# Patient Record
Sex: MALE | Race: WHITE | URBAN - METROPOLITAN AREA
[De-identification: names, ages, dates, MRNs, and addresses within clinical notes are randomized per-mention and may not be internally consistent; named-entity substitution may affect disease eponyms.]

---

## 2017-06-07 ENCOUNTER — OUTPATIENT (OUTPATIENT)
Dept: OUTPATIENT SERVICES | Facility: HOSPITAL | Age: 37
LOS: 1 days | Discharge: HOME | End: 2017-06-07

## 2017-06-28 DIAGNOSIS — K01.1 IMPACTED TEETH: ICD-10-CM

## 2017-08-06 ENCOUNTER — EMERGENCY (EMERGENCY)
Facility: HOSPITAL | Age: 37
LOS: 0 days | Discharge: HOME | End: 2017-08-07

## 2017-08-06 DIAGNOSIS — N48.89 OTHER SPECIFIED DISORDERS OF PENIS: ICD-10-CM

## 2017-08-06 DIAGNOSIS — R30.0 DYSURIA: ICD-10-CM

## 2017-08-06 DIAGNOSIS — Z87.891 PERSONAL HISTORY OF NICOTINE DEPENDENCE: ICD-10-CM

## 2017-10-01 ENCOUNTER — EMERGENCY (EMERGENCY)
Facility: HOSPITAL | Age: 37
LOS: 0 days | Discharge: HOME | End: 2017-10-01

## 2017-10-01 DIAGNOSIS — Z87.891 PERSONAL HISTORY OF NICOTINE DEPENDENCE: ICD-10-CM

## 2017-10-01 DIAGNOSIS — A60.02 HERPESVIRAL INFECTION OF OTHER MALE GENITAL ORGANS: ICD-10-CM

## 2017-10-01 DIAGNOSIS — R30.0 DYSURIA: ICD-10-CM

## 2017-11-02 ENCOUNTER — EMERGENCY (EMERGENCY)
Facility: HOSPITAL | Age: 37
LOS: 0 days | Discharge: HOME | End: 2017-11-03

## 2017-11-08 DIAGNOSIS — N50.89 OTHER SPECIFIED DISORDERS OF THE MALE GENITAL ORGANS: ICD-10-CM

## 2017-11-08 DIAGNOSIS — A60.02 HERPESVIRAL INFECTION OF OTHER MALE GENITAL ORGANS: ICD-10-CM

## 2017-11-28 ENCOUNTER — APPOINTMENT (OUTPATIENT)
Dept: UROLOGY | Facility: CLINIC | Age: 37
End: 2017-11-28

## 2017-12-06 ENCOUNTER — EMERGENCY (EMERGENCY)
Facility: HOSPITAL | Age: 37
LOS: 0 days | Discharge: HOME | End: 2017-12-06

## 2017-12-06 DIAGNOSIS — R07.9 CHEST PAIN, UNSPECIFIED: ICD-10-CM

## 2017-12-06 DIAGNOSIS — B34.9 VIRAL INFECTION, UNSPECIFIED: ICD-10-CM

## 2017-12-06 DIAGNOSIS — R05 COUGH: ICD-10-CM

## 2018-03-17 ENCOUNTER — OUTPATIENT (OUTPATIENT)
Dept: OUTPATIENT SERVICES | Facility: HOSPITAL | Age: 38
LOS: 1 days | Discharge: HOME | End: 2018-03-17

## 2018-03-17 DIAGNOSIS — R79.89 OTHER SPECIFIED ABNORMAL FINDINGS OF BLOOD CHEMISTRY: ICD-10-CM

## 2019-05-19 ENCOUNTER — EMERGENCY (EMERGENCY)
Facility: HOSPITAL | Age: 39
LOS: 0 days | Discharge: HOME | End: 2019-05-19
Admitting: EMERGENCY MEDICINE
Payer: MEDICAID

## 2019-05-19 VITALS
HEART RATE: 66 BPM | TEMPERATURE: 98 F | WEIGHT: 179.9 LBS | DIASTOLIC BLOOD PRESSURE: 89 MMHG | OXYGEN SATURATION: 98 % | RESPIRATION RATE: 18 BRPM | SYSTOLIC BLOOD PRESSURE: 137 MMHG

## 2019-05-19 DIAGNOSIS — K64.9 UNSPECIFIED HEMORRHOIDS: ICD-10-CM

## 2019-05-19 DIAGNOSIS — Z79.899 OTHER LONG TERM (CURRENT) DRUG THERAPY: ICD-10-CM

## 2019-05-19 DIAGNOSIS — K59.00 CONSTIPATION, UNSPECIFIED: ICD-10-CM

## 2019-05-19 PROCEDURE — 99283 EMERGENCY DEPT VISIT LOW MDM: CPT | Mod: 25

## 2019-05-19 RX ORDER — HYDROCORTISONE 1 %
1 OINTMENT (GRAM) TOPICAL
Qty: 1 | Refills: 0
Start: 2019-05-19 | End: 2019-05-25

## 2019-05-19 RX ORDER — DOCUSATE SODIUM 100 MG
1 CAPSULE ORAL
Qty: 14 | Refills: 0
Start: 2019-05-19 | End: 2019-05-25

## 2019-05-19 NOTE — ED PROVIDER NOTE - CARE PROVIDER_API CALL
Celso Alvarez)  ColonRectal Surgery  [address] [place] [address], [address] [place] [address]  Phone: [phone]  Fax: [phone]  Follow Up Time:

## 2019-05-19 NOTE — ED PROVIDER NOTE - NSFOLLOWUPINSTRUCTIONS_ED_ALL_ED_FT
Hemorrhoids    Hemorrhoids are swollen veins in and around the rectum or anus. There are two types of hemorrhoids:     Internal hemorrhoids. These occur in the veins that are just inside the rectum. They may poke through to the outside and become irritated and painful.  External hemorrhoids. These occur in the veins that are outside of the anus and can be felt as a painful swelling or hard lump near the anus.    Most hemorrhoids do not cause serious problems, and they can be managed with home treatments such as diet and lifestyle changes. If home treatments do not help your symptoms, procedures can be done to shrink or remove the hemorrhoids.    CAUSES  This condition is caused by increased pressure in the anal area. This pressure may result from various things, including:    Constipation.  Straining to have a bowel movement.  Diarrhea.  Pregnancy.  Obesity.  Sitting for long periods of time.  Heavy lifting or other activity that causes you to strain.  Anal sex.    SYMPTOMS  Symptoms of this condition include:    Pain.  Anal itching or irritation.  Rectal bleeding.  Leakage of stool (feces).  Anal swelling.  One or more lumps around the anus.    DIAGNOSIS  This condition can often be diagnosed through a visual exam. Other exams or tests may also be done, such as:    Examination of the rectal area with a gloved hand (digital rectal exam).  Examination of the anal canal using a small tube (anoscope).  A blood test, if you have lost a significant amount of blood.  A test to look inside the colon (sigmoidoscopy or colonoscopy).    TREATMENT  This condition can usually be treated at home. However, various procedures may be done if dietary changes, lifestyle changes, and other home treatments do not help your symptoms. These procedures can help make the hemorrhoids smaller or remove them completely. Some of these procedures involve surgery, and others do not. Common procedures include:    Rubber band ligation. Rubber bands are placed at the base of the hemorrhoids to cut off the blood supply to them.  Sclerotherapy. Medicine is injected into the hemorrhoids to shrink them.  Infrared coagulation. A type of light energy is used to get rid of the hemorrhoids.  Hemorrhoidectomy surgery. The hemorrhoids are surgically removed, and the veins that supply them are tied off.  Stapled hemorrhoidopexy surgery. A circular stapling device is used to remove the hemorrhoids and use staples to cut off the blood supply to them.    HOME CARE INSTRUCTIONS  Eating and Drinking    Eat foods that have a lot of fiber in them, such as whole grains, beans, nuts, fruits, and vegetables. Ask your health care provider about taking products that have added fiber (fiber supplements).  Drink enough fluid to keep your urine clear or pale yellow.    Managing Pain and Swelling    Take warm sitz baths for 20 minutes, 3–4 times a day to ease pain and discomfort.  If directed, apply ice to the affected area. Using ice packs between sitz baths may be helpful.  Put ice in a plastic bag.  Place a towel between your skin and the bag.  Leave the ice on for 20 minutes, 2–3 times a day.    General Instructions    Take over-the-counter and prescription medicines only as told by your health care provider.  Use medicated creams or suppositories as told.  Exercise regularly.  Go to the bathroom when you have the urge to have a bowel movement. Do not wait.  Avoid straining to have bowel movements.  Keep the anal area dry and clean. Use wet toilet paper or moist towelettes after a bowel movement.  Do not sit on the toilet for long periods of time. This increases blood pooling and pain.    SEEK MEDICAL CARE IF:  You have increasing pain and swelling that are not controlled by treatment or medicine.  You have uncontrolled bleeding.  You have difficulty having a bowel movement, or you are unable to have a bowel movement.  You have pain or inflammation outside the area of the hemorrhoids.    ADDITIONAL NOTES AND INSTRUCTIONS    Please follow up with your Primary MD in 24-48 hr.  Seek immediate medical care for any new/worsening signs or symptoms.

## 2019-05-19 NOTE — ED PROVIDER NOTE - PHYSICAL EXAMINATION
CONSTITUTIONAL: Well-appearing; well-nourished; in no apparent distress.   GI/: +hemorrhoid, 9oclock, nonthrombosed; Normal bowel sounds; non-distended; non-tender; no palpable organomegaly.   SKIN: see above; Normal for age and race; warm; dry; good turgor; no apparent lesions or exudate.   NEURO/PSYCH: A & O x 4; grossly unremarkable. mood and manner are appropriate. Grooming and personal hygiene are appropriate. No apparent thoughts of harm to self or others.

## 2019-05-19 NOTE — ED PROVIDER NOTE - OBJECTIVE STATEMENT
h/o hemorrhoids presents for hemorrhoid noted for the last week  pain with defacation, +constipation, denies bleeding  pain is sharp, nonradiating, moderate  denies exacerbating or relieving factors  Denies fever/chill/HA/dizziness/chest pain/palpitation/sob/abd pain/n/v/d/ black stool/bloody stool/urinary sxs

## 2019-05-19 NOTE — ED PROVIDER NOTE - PROGRESS NOTE DETAILS
+constipation, +hemorrhoid- plan for colace, increased po fluid intake, anusol and f/u with colorectal  Pt counseled - warning si/sxs d/w pt. Pt instructed to return to ed or f/u with PCP should sxs persist/worsen. Pt verbalizes an understanding & agreement with the plan as discussed

## 2019-05-20 ENCOUNTER — CHART COPY (OUTPATIENT)
Age: 39
End: 2019-05-20

## 2020-02-19 PROBLEM — K64.9 UNSPECIFIED HEMORRHOIDS: Chronic | Status: ACTIVE | Noted: 2019-05-19

## 2020-04-02 ENCOUNTER — APPOINTMENT (OUTPATIENT)
Dept: UROLOGY | Facility: CLINIC | Age: 40
End: 2020-04-02

## 2020-07-31 ENCOUNTER — APPOINTMENT (OUTPATIENT)
Dept: UROLOGY | Facility: CLINIC | Age: 40
End: 2020-07-31

## 2022-08-16 ENCOUNTER — APPOINTMENT (OUTPATIENT)
Dept: INTERNAL MEDICINE | Facility: CLINIC | Age: 42
End: 2022-08-16

## 2022-10-30 ENCOUNTER — EMERGENCY (EMERGENCY)
Facility: HOSPITAL | Age: 42
LOS: 0 days | Discharge: HOME | End: 2022-10-30
Attending: EMERGENCY MEDICINE | Admitting: EMERGENCY MEDICINE

## 2022-10-30 VITALS
DIASTOLIC BLOOD PRESSURE: 90 MMHG | WEIGHT: 169.98 LBS | OXYGEN SATURATION: 100 % | SYSTOLIC BLOOD PRESSURE: 150 MMHG | RESPIRATION RATE: 18 BRPM | HEART RATE: 88 BPM | TEMPERATURE: 98 F

## 2022-10-30 DIAGNOSIS — R35.0 FREQUENCY OF MICTURITION: ICD-10-CM

## 2022-10-30 DIAGNOSIS — R36.9 URETHRAL DISCHARGE, UNSPECIFIED: ICD-10-CM

## 2022-10-30 DIAGNOSIS — R30.0 DYSURIA: ICD-10-CM

## 2022-10-30 LAB
APPEARANCE UR: CLEAR — SIGNIFICANT CHANGE UP
BACTERIA # UR AUTO: NEGATIVE — SIGNIFICANT CHANGE UP
BILIRUB UR-MCNC: NEGATIVE — SIGNIFICANT CHANGE UP
COLOR SPEC: SIGNIFICANT CHANGE UP
DIFF PNL FLD: NEGATIVE — SIGNIFICANT CHANGE UP
EPI CELLS # UR: 1 /HPF — SIGNIFICANT CHANGE UP (ref 0–5)
GLUCOSE UR QL: NEGATIVE — SIGNIFICANT CHANGE UP
HYALINE CASTS # UR AUTO: 3 /LPF — SIGNIFICANT CHANGE UP (ref 0–7)
KETONES UR-MCNC: NEGATIVE — SIGNIFICANT CHANGE UP
LEUKOCYTE ESTERASE UR-ACNC: ABNORMAL
NITRITE UR-MCNC: NEGATIVE — SIGNIFICANT CHANGE UP
PH UR: 6 — SIGNIFICANT CHANGE UP (ref 5–8)
PROT UR-MCNC: SIGNIFICANT CHANGE UP
RBC CASTS # UR COMP ASSIST: 4 /HPF — SIGNIFICANT CHANGE UP (ref 0–4)
SP GR SPEC: 1.02 — SIGNIFICANT CHANGE UP (ref 1.01–1.03)
UROBILINOGEN FLD QL: SIGNIFICANT CHANGE UP
WBC UR QL: 32 /HPF — HIGH (ref 0–5)

## 2022-10-30 PROCEDURE — 99284 EMERGENCY DEPT VISIT MOD MDM: CPT

## 2022-10-30 RX ORDER — CEFTRIAXONE 500 MG/1
500 INJECTION, POWDER, FOR SOLUTION INTRAMUSCULAR; INTRAVENOUS ONCE
Refills: 0 | Status: COMPLETED | OUTPATIENT
Start: 2022-10-30 | End: 2022-10-30

## 2022-10-30 RX ORDER — CEFUROXIME AXETIL 250 MG
1 TABLET ORAL
Qty: 14 | Refills: 0
Start: 2022-10-30 | End: 2022-11-05

## 2022-10-30 RX ORDER — CEFUROXIME AXETIL 250 MG
1 TABLET ORAL
Qty: 14 | Refills: 0
Start: 2022-10-30 | End: 2022-11-19

## 2022-10-30 RX ADMIN — CEFTRIAXONE 500 MILLIGRAM(S): 500 INJECTION, POWDER, FOR SOLUTION INTRAMUSCULAR; INTRAVENOUS at 01:52

## 2022-10-30 RX ADMIN — Medication 100 MILLIGRAM(S): at 01:51

## 2022-10-30 NOTE — ED PROVIDER NOTE - PHYSICAL EXAMINATION
Const: Well nourished, well developed, appears stated age  Eyes: PERRL, no conjunctival injection  HENT:  Neck supple without meningismus   CV: RRR, Warm, well-perfused extremities  RESP: CTA B/L, no tachypnea   GI: soft, non-tender, non-distended  : uncircumcised. no penile discharge from meatus. no testicular pain or swelling (exam done with DARYL Mccartney)  MSK: No gross deformities appreciated  Skin: Warm, dry. No rashes  Neuro: Alert, CNs II-XII grossly intact. Sensation and motor function of extremities grossly intact.  Psych: Appropriate mood and affect.

## 2022-10-30 NOTE — ED PROVIDER NOTE - NS ED ROS FT
Review of Systems   Constitutional:  No Weight Change, No Fever, No Chills, No weakness    Cardiovascular:  No Chest Pain,   Respiratory:  No SOB, No Cough,   Gastrointestinal:  No Nausea, No Vomiting, No abdominal pain,   Genitourinary:  + Dysuria, + Urinary Frequency, No Hematuria, No Urinary Incontinence, penile discharge, no testicular pain, no penile pain or swelling   Musculoskeletal:  No Arthralgias, No Myalgias, No Joint Swelling, No Joint Stiffness,  Skin:  No rashes

## 2022-10-30 NOTE — ED ADULT TRIAGE NOTE - CHIEF COMPLAINT QUOTE
Pt presents to the ED c/o of having problems fully urinating as well as a burning sensation when he pees.

## 2022-10-30 NOTE — ED ADULT NURSE NOTE - NS ED NURSE LEVEL OF CONSCIOUSNESS SPEECH
Addended by: MARITO LUNA on: 9/27/2018 12:54 PM     Modules accepted: Orders    
Addended by: SAVANNA CLEMENS on: 9/27/2018 11:49 AM     Modules accepted: Orders    
Speaking Coherently

## 2022-10-30 NOTE — ED PROVIDER NOTE - OBJECTIVE STATEMENT
42-year-old male with no past medical history presents to ED for dysuria as well as light yellow discharge from his penis that started at 6 PM today.  Patient's never had this in the past.  No testicular pain no penile pain.  No abdominal pain no flank pain no fever no chills.  Patient states he sexually active with his wife no other sexual partners.  No history of STDs. Yes

## 2022-10-30 NOTE — ED PROVIDER NOTE - CLINICAL SUMMARY MEDICAL DECISION MAKING FREE TEXT BOX
42-year-old male presenting to ED for penile discharge concerning for STI.  Patient was treated in the emergency department.  Culture pending DC home with strict return precautions.

## 2022-10-30 NOTE — ED PROVIDER NOTE - PATIENT PORTAL LINK FT
You can access the FollowMyHealth Patient Portal offered by [facility] by registering at the following website: [email or web address]. By joining [employer]’s FollowMyHealth portal, you will also be able to view your health information using other applications (apps) compatible with our system.

## 2022-10-31 LAB
C TRACH RRNA SPEC QL NAA+PROBE: SIGNIFICANT CHANGE UP
CULTURE RESULTS: SIGNIFICANT CHANGE UP
N GONORRHOEA RRNA SPEC QL NAA+PROBE: DETECTED
SPECIMEN SOURCE: SIGNIFICANT CHANGE UP
SPECIMEN SOURCE: SIGNIFICANT CHANGE UP

## 2022-11-01 NOTE — ED POST DISCHARGE NOTE - DETAILS
I AM A CERTIFIED [place] SPEAKER. PRECAUTIONS ADVISED. ADVISED F/U WITH PMD THIS WEEK. ADVISED REPEAT TESTING WITHIN 4 WEEKS.

## 2022-11-13 ENCOUNTER — EMERGENCY (EMERGENCY)
Facility: HOSPITAL | Age: 42
LOS: 0 days | Discharge: HOME | End: 2022-11-13
Attending: EMERGENCY MEDICINE | Admitting: EMERGENCY MEDICINE

## 2022-11-13 VITALS
TEMPERATURE: 97 F | WEIGHT: 169.76 LBS | OXYGEN SATURATION: 99 % | HEART RATE: 83 BPM | DIASTOLIC BLOOD PRESSURE: 94 MMHG | RESPIRATION RATE: 18 BRPM | SYSTOLIC BLOOD PRESSURE: 149 MMHG

## 2022-11-13 DIAGNOSIS — R21 RASH AND OTHER NONSPECIFIC SKIN ERUPTION: ICD-10-CM

## 2022-11-13 DIAGNOSIS — R30.0 DYSURIA: ICD-10-CM

## 2022-11-13 DIAGNOSIS — R36.9 URETHRAL DISCHARGE, UNSPECIFIED: ICD-10-CM

## 2022-11-13 LAB
APPEARANCE UR: CLEAR — SIGNIFICANT CHANGE UP
BILIRUB UR-MCNC: NEGATIVE — SIGNIFICANT CHANGE UP
COLOR SPEC: SIGNIFICANT CHANGE UP
DIFF PNL FLD: NEGATIVE — SIGNIFICANT CHANGE UP
GLUCOSE UR QL: NEGATIVE — SIGNIFICANT CHANGE UP
HIV 1 & 2 AB SERPL IA.RAPID: SIGNIFICANT CHANGE UP
KETONES UR-MCNC: NEGATIVE — SIGNIFICANT CHANGE UP
LEUKOCYTE ESTERASE UR-ACNC: NEGATIVE — SIGNIFICANT CHANGE UP
NITRITE UR-MCNC: NEGATIVE — SIGNIFICANT CHANGE UP
PH UR: 6 — SIGNIFICANT CHANGE UP (ref 5–8)
PROT UR-MCNC: NEGATIVE — SIGNIFICANT CHANGE UP
SP GR SPEC: 1.01 — SIGNIFICANT CHANGE UP (ref 1.01–1.03)
UROBILINOGEN FLD QL: SIGNIFICANT CHANGE UP

## 2022-11-13 PROCEDURE — 99284 EMERGENCY DEPT VISIT MOD MDM: CPT

## 2022-11-13 RX ORDER — VALACYCLOVIR 500 MG/1
1 TABLET, FILM COATED ORAL
Qty: 10 | Refills: 0
Start: 2022-11-13 | End: 2022-11-22

## 2022-11-13 RX ORDER — CEFTRIAXONE 500 MG/1
500 INJECTION, POWDER, FOR SOLUTION INTRAMUSCULAR; INTRAVENOUS ONCE
Refills: 0 | Status: COMPLETED | OUTPATIENT
Start: 2022-11-13 | End: 2022-11-13

## 2022-11-13 RX ORDER — METRONIDAZOLE 500 MG
1 TABLET ORAL
Qty: 14 | Refills: 0
Start: 2022-11-13 | End: 2022-11-19

## 2022-11-13 RX ADMIN — Medication 100 MILLIGRAM(S): at 01:47

## 2022-11-13 RX ADMIN — CEFTRIAXONE 500 MILLIGRAM(S): 500 INJECTION, POWDER, FOR SOLUTION INTRAMUSCULAR; INTRAVENOUS at 01:50

## 2022-11-13 NOTE — ED PROVIDER NOTE - CARE PLAN
Assessment and plan of treatment:	Plan: ua + ucx, pt would like to re tested, and treated, will also add anit virals, safe sex practices also discussed.   Principal Discharge DX:	Dysuria  Assessment and plan of treatment:	Plan: ua + ucx, pt would like to re tested, and treated, will also add anit virals, safe sex practices also discussed.   1

## 2022-11-13 NOTE — ED PROVIDER NOTE - PLAN OF CARE
Plan: ua + ucx, pt would like to re tested, and treated, will also add anit virals, safe sex practices also discussed.

## 2022-11-13 NOTE — ED PROVIDER NOTE - PHYSICAL EXAMINATION
Vital Signs: I have reviewed the initial vital signs.  Constitutional: well-nourished, appears stated age, no acute distress.  HEENT: Airway patent, moist MM, no erythema/swelling/deformity of oral structures. EOMI, PERRLA.  CV: regular rate, regular rhythm, well-perfused extremities, 2+ bilateral DP and radial pulses equal.  Lungs: Clear to ascultation bilaterally, no crackles, no wheezes, no increased work of breathing.  ABD: Non-tender, Non-distended, no guarding or rebound, no pulsatile mass, no hernias, no flank pain.   : uncircumcised penis, no penile discharge, vesicles to R side of penile head, no inguinal lymphadenopathy   MSK: Neck supple, nontender, normal range of motion, no stepoff. Chest nontender. Back nontender   INTEG: Skin warm, dry, no rash.  NEURO: A&Ox3, moving all extremities, normal speech  PSYCH: Calm, cooperative, normal affect and interaction.

## 2022-11-13 NOTE — ED PROVIDER NOTE - PROGRESS NOTE DETAILS
ED Attending SAMI Rojas  Extensive conversation had with patient using .  Patient will receive sex practices.  Medications sent to pharmacy including antiviral.  Patient aware of importance of obtaining a PMD.  Follow-up with neurology and infectious disease as well.  Patient comfortable with plan.  States will follow up.

## 2022-11-13 NOTE — ED PROVIDER NOTE - ATTENDING CONTRIBUTION TO CARE
: [id number].  42-year-old male with no significant past medical history seen in the emergency department on October 30 for dysuria associated with penile discharge tested positive for gonorrhea (was treated with ceftriaxone and doxycycline, prescription was sent for doxycycline and cefuroxime which patient reports he took) presents for persistent symptoms of dysuria,  penile discharge associated with penile lesions that are painful as well as pruritus around the penis.  Patient reports he has not been sexually active for the past 3 weeks and the last time he was sexually active was with the same partner only female.  No previous history of STI.  denies fever, chills, n/v, cp, sob, pleuritic chest pain, palpitations, diaphoresis, cough, abd pain , diarrhea, constipation, melena/brbpr, uback/ flank pain, testicular pain/swelling/erythema, history of sti's, sick contacts, recent travel or rash.      on exam: non toxic appearing pt sitting on stretcher in nad, no rash, mmm, regular rate, radial pulses 2/4 b/l, no jvd, ctabl w/ breath sounds present b/l, no wheezing or crackles,  no accessory muscle use, no tachypnea, no stridor, bs present throughout all 4 quadrants, abd soft, nd, nt , no rebound tenderness or guarding, no cvat, (-) Rovsing (-) Obturator (-) Psaos. (-) Smiley's,  exam done with Dr. Solomon with supervision: non circumcised male, no paraphimosis or phimosis, no penile discharge, no testicular pain/swelling/erythema, good cremasteric reflex, no inguinal palpable masses. no presence of hernia. small papules on penis as well as small vesicular lesions. FROM of ext, no edema, no calf pain/swelling/erythema, AAOx3. No focal deficits.

## 2022-11-13 NOTE — ED PROVIDER NOTE - NSFOLLOWUPINSTRUCTIONS_ED_ALL_ED_FT
Las infecciones de transmisión sexual (ITS) son enfermedades que se transmiten de damon persona a otra (se contagian). Se propagan o se transmitena través de los líquidos corporales que se intercambian alysha las relaciones sexuales o el contacto sexual. Estos líquidos corporales pueden ser saliva, semen, jian, mucosidad vaginal y orina. Las ITS son muy frecuentes entre personas de todas las edades.    Algunas de las ITS más frecuentes son las siguientes:  •Herpes.      •Hepatitis B.      •Clamidia.      •Gonorrea.      •Sífilis.      •VPH (virus del papiloma humano).      •VIH, también denominado virus de inmunodeficiencia humana. Hanh es el virus que puede causar SIDA (síndrome de inmunodeficiencia adquirida).      A menudo, las personas que tienen estas ITS no tienen síntomas. Incluso sin síntomas, estas infecciones pueden transmitirse de damon persona a otra y requieren tratamiento.      ¿Cómo pueden afectarme estas enfermedades?    Las ITS pueden tratarse, y muchas se curan. Sin embargo, algunas ITS no tienen sherman y lo afectarán por el zay de winn talya.    Ciertas ITS pueden tener las siguientes consecuencias:  •Obligarlo a seamus medicamentos por el zay de winn talya.      •Afectar winn capacidad para tener hijos (winn fertilidad).    •Aumentar el riesgo de desarrollar otra ITS o ciertas enfermedades graves. Estas pueden incluir las siguientes:  •Cáncer de joseph uterino.      •Cáncer de chaya y joseph.      •Enfermedad pélvica inflamatoria (EPI) en las mujeres.      •Daño en los órganos u otras partes del cuerpo, si la infección se propaga.        •Causar problemas alysha el embarazo; además, pueden transmitirse al bebé alysha el embarazo o el parto.        ¿Qué puede aumentar el riesgo?    Puede tener un riesgo mayor de contraer damon ITS si:   •Tiene relaciones sexuales sin protección. Princess Anne chan las relaciones sexuales orales, vaginales y anales.      •Tiene más de damon qing sexual.      •Tiene damon qing sexual que tiene múltiples parejas sexuales.      •Tiene relaciones sexuales con alguien que tiene damon ITS.      •Tiene damon ITS o tuvo damon ITS antes.      •Se inyecta drogas o tiene damon o más parejas sexuales que se inyectan drogas.        ¿Qué medidas puedo seamus para evitar las ITS?    La única manera de evitar totalmente las ITS es no tener relaciones sexuales de ningún tipo. Princess Anne se denomina practicar la abstinencia. Si usted es sexualmente activo, puede protegerse y proteger a los demás tomando estas medidas para disminuir el riesgo de contraer damon ITS:    Estilo de talya     Evite mezclar alcohol, drogas y sexo. El consumo de alcohol y drogas puede afectar winn capacidad de seamus buenas decisiones y puede conducirlo a comportamientos sexuales riesgosos.    Medicamentos     Pregunte al médico sobre la profilaxis preexposición (PrEP) para evitar la infección por VIH.      Información general    •Mantenga winn esquema de vacunación al día. Algunas vacunas pueden disminuir el riesgo de contraer algunas ITS, tales sam:  •Vacunas contra la hepatitis A y B. Es posible que haya recibido vacunas cuando era kennedy, geoffrey probablemente necesite damon vacuna/inyección de refuerzo si es un adolescente o un adulto joven.      •Vacuna contra el VPH (virus del papiloma humano).        •Tenga damon susannah qing sexual (sea monógamo) o limite la cantidad de parejas sexuales que tiene.    •Use métodos que impidan el intercambio de líquidos corporales entre la qing (anticonceptivos de foote) de manera correcta cada vez que tenga relaciones sexuales. Los anticonceptivos de foote se pueden usar alysha las relaciones sexuales orales, vaginales o anales. Algunos de los métodos de foote más usados son los siguientes:  •Condón masculino.       •Condón femenino.       •Foote bucal.        •Use un condón nuevo para cada acto sexual, desde el comienzo hasta el final.      •Hágase exámenes de ITS. Riri que ronit parejas también se realicen los exámenes.      •Si el resultado del examen de damon ITS es positivo, siga las recomendaciones del médico sobre el tratamiento y asegúrese de que ronit parejas sexuales se realicen exámenes y reciban tratamiento.      •Las píldoras, inyecciones e implantes anticonceptivos y los dispositivos intrauterinos (DIU) no protegen contra las ITS. Para evitar tanto las ITS sam un embarazo, use siempre un condón combinado con otro método anticonceptivo.      •Algunas ITS, sam el herpes, se contagian por el contacto piel con piel. Es posible que un condón no proporcione protección contra esas ITS. Evite todo contacto sexual si usted o ronit parejas tienen herpes y hay damon erupción activa con heridas abiertas.        Dónde buscar más información    Obtenga más información sobre las ITS en:•Centers for Disease Control and Prevention (Centros para el Control y la Prevención de Enfermedades):  •Más información sobre determinadas ITS: cdc.gov/std      •Lugares donde obtener asesoramiento y tratamiento respecto de la yamilex sexual, gratuitos o de bajo costo: gettested.cdc.gov        •U.S. Department of Health and Human Services (Departamento de Yamilex y Servicios Humanos de los Estados Unidos): www.womenshealth.gov        Resumen    •Las infecciones de transmisión sexual (ITS) pueden transmitirse a través del intercambio de líquidos corporales alysha el contacto sexual. Los líquidos corporales pueden ser saliva, semen, jian, mucosidad vaginal y orina.      •Usted puede tener un mayor riesgo de contraer damon ITS si tiene relaciones sexuales sin protección. Princess Anne chan las relaciones sexuales orales, vaginales y anales.      •Si tiene relaciones sexuales, limite la cantidad de parejas sexuales y use un método de protección de foote cada vez que tenga relaciones sexuales.      Esta información no tiene sam fin reemplazar el consejo del médico. Asegúrese de hacerle al médico cualquier pregunta que tenga.

## 2022-11-13 NOTE — ED PROVIDER NOTE - OBJECTIVE STATEMENT
Patient is a 42y M recently evaluated for dysuria testing (+) for gonorrhea presents for evaluation of dysuria and "pimples" on his penis. Patient completed his course of antibiotics but his symptoms did not improve so presents for reevaluation. Patient is sexually active with 1 female partner last intercourse 3 weeks ago using protection. No previous hx of STI.

## 2022-11-13 NOTE — ED PROVIDER NOTE - PATIENT PORTAL LINK FT
You can access the FollowMyHealth Patient Portal offered by [facility] by registering at the following website: [email or web address]. By joining [employer]’s FollowMyHealth portal, you will also be able to view your health information using other applications (apps) compatible with our system.

## 2022-11-13 NOTE — ED PROVIDER NOTE - CLINICAL SUMMARY MEDICAL DECISION MAKING FREE TEXT BOX
Pt presented for persistent symptoms of dysuria with penile discharge and lesions. STI testing re sent and meds given, pt vesicular lesions on exam, concern for herpes, anti viral added. Patient is a good candidate to attempt outpatient management. Supportive care and home care discussed in detail. Patient aware they may have to return for re-evaluation if outpatient treatment fails. Strict return precautions discussed.  Will follow up as discussed.

## 2022-11-13 NOTE — ED PROVIDER NOTE - NS ED ROS FT
Review of Systems    Constitutional: (-) fever  Cardiovascular: (-) chest pain, (-) syncope  Respiratory: (-) cough, (-) shortness of breath  Gastrointestinal: (-) vomiting, (-) diarrhea, (-) abdominal pain  Musculoskeletal: (-) neck pain, (-) back pain, (-) joint pain  Integumentary: (+) rash on penis, (-) edema  Neurological: (-) headache, (-) altered mental status    Except as documented in the HPI, all other systems are negative.

## 2022-11-14 LAB
CULTURE RESULTS: NO GROWTH — SIGNIFICANT CHANGE UP
SPECIMEN SOURCE: SIGNIFICANT CHANGE UP
T PALLIDUM AB TITR SER: NEGATIVE — SIGNIFICANT CHANGE UP

## 2022-12-01 ENCOUNTER — APPOINTMENT (OUTPATIENT)
Dept: UROLOGY | Facility: CLINIC | Age: 42
End: 2022-12-01

## 2023-03-08 ENCOUNTER — EMERGENCY (EMERGENCY)
Facility: HOSPITAL | Age: 43
LOS: 0 days | Discharge: HOME | End: 2023-03-09
Attending: STUDENT IN AN ORGANIZED HEALTH CARE EDUCATION/TRAINING PROGRAM
Payer: MEDICAID

## 2023-03-08 VITALS
HEIGHT: 66 IN | DIASTOLIC BLOOD PRESSURE: 80 MMHG | HEART RATE: 78 BPM | OXYGEN SATURATION: 99 % | SYSTOLIC BLOOD PRESSURE: 130 MMHG | WEIGHT: 160.06 LBS | RESPIRATION RATE: 18 BRPM | TEMPERATURE: 98 F

## 2023-03-08 DIAGNOSIS — Z87.19 PERSONAL HISTORY OF OTHER DISEASES OF THE DIGESTIVE SYSTEM: ICD-10-CM

## 2023-03-08 DIAGNOSIS — Z20.2 CONTACT WITH AND (SUSPECTED) EXPOSURE TO INFECTIONS WITH A PREDOMINANTLY SEXUAL MODE OF TRANSMISSION: ICD-10-CM

## 2023-03-08 DIAGNOSIS — A54.9 GONOCOCCAL INFECTION, UNSPECIFIED: ICD-10-CM

## 2023-03-08 PROCEDURE — 86703 HIV-1/HIV-2 1 RESULT ANTBDY: CPT

## 2023-03-08 PROCEDURE — 99283 EMERGENCY DEPT VISIT LOW MDM: CPT | Mod: 25

## 2023-03-08 PROCEDURE — 86706 HEP B SURFACE ANTIBODY: CPT

## 2023-03-08 PROCEDURE — 87340 HEPATITIS B SURFACE AG IA: CPT

## 2023-03-08 PROCEDURE — 86780 TREPONEMA PALLIDUM: CPT

## 2023-03-08 PROCEDURE — 87591 N.GONORRHOEAE DNA AMP PROB: CPT

## 2023-03-08 PROCEDURE — 86803 HEPATITIS C AB TEST: CPT

## 2023-03-08 PROCEDURE — 36415 COLL VENOUS BLD VENIPUNCTURE: CPT

## 2023-03-08 PROCEDURE — 99284 EMERGENCY DEPT VISIT MOD MDM: CPT

## 2023-03-08 PROCEDURE — 80074 ACUTE HEPATITIS PANEL: CPT

## 2023-03-08 PROCEDURE — 87491 CHLMYD TRACH DNA AMP PROBE: CPT

## 2023-03-08 PROCEDURE — 96372 THER/PROPH/DIAG INJ SC/IM: CPT

## 2023-03-08 NOTE — ED ADULT NURSE NOTE - CAS ELECT INFOMATION PROVIDED
pt instructed to follow up with pmd in 1-3 days or return to ed fo new or worsening symptoms, instructed to take medication as prescribed/DC instructions

## 2023-03-08 NOTE — ED ADULT NURSE NOTE - HISTORY OF COVID-19 VACCINATION
Vaccine status unknown [Follow-Up - Clinic] : a clinic follow-up of [FreeTextEntry2] : chest pain and palpitations

## 2023-03-09 LAB
C TRACH RRNA SPEC QL NAA+PROBE: SIGNIFICANT CHANGE UP
HCV AB S/CO SERPL IA: 0.04 COI — SIGNIFICANT CHANGE UP
HCV AB SERPL-IMP: SIGNIFICANT CHANGE UP
HIV 1 & 2 AB SERPL IA.RAPID: SIGNIFICANT CHANGE UP
N GONORRHOEA RRNA SPEC QL NAA+PROBE: DETECTED
SPECIMEN SOURCE: SIGNIFICANT CHANGE UP

## 2023-03-09 RX ORDER — CEFTRIAXONE 500 MG/1
500 INJECTION, POWDER, FOR SOLUTION INTRAMUSCULAR; INTRAVENOUS ONCE
Refills: 0 | Status: COMPLETED | OUTPATIENT
Start: 2023-03-09 | End: 2023-03-09

## 2023-03-09 RX ADMIN — CEFTRIAXONE 500 MILLIGRAM(S): 500 INJECTION, POWDER, FOR SOLUTION INTRAMUSCULAR; INTRAVENOUS at 00:53

## 2023-03-09 RX ADMIN — Medication 100 MILLIGRAM(S): at 00:53

## 2023-03-09 NOTE — ED PROVIDER NOTE - NS ED ROS FT
Constitutional: no fever, chills, no recent weight loss, change in appetite or malaise  Eyes: no redness/discharge/pain/vision changes  ENT: no rhinorrhea/ear pain/sore throat  Cardiac: No chest pain, SOB or edema.  Respiratory: No cough or respiratory distress  GI: No nausea, vomiting, diarrhea or abdominal pain.  : No frequency, urgency or hematuria  MS: no pain to back or extremities, no loss of ROM, no weakness  Neuro: No headache or weakness. No LOC.  Skin: No skin rash.  Except as documented in the HPI, all other systems are negative.

## 2023-03-09 NOTE — ED PROVIDER NOTE - CLINICAL SUMMARY MEDICAL DECISION MAKING FREE TEXT BOX
Patient with no past medical history presents with penile discharge unprotected intercourse with unknown partner denies any fever chills no palpitations.  Well appearing, NAD, non toxic. NCAT PERRLA EOMI neck supple non tender normal wob cta bl rrr abdomen s nt nd no rebound no guarding WWPx4 neuro non focal.   exam deferred by patient will discharge with antibiotics

## 2023-03-09 NOTE — ED PROVIDER NOTE - NSFOLLOWUPCLINICS_GEN_ALL_ED_FT
[facility] Medicine [facility]  Medicine  [address], [place]   Phone: [phone]  Fax:     [facility]  Urology  .  [place]   Phone: [phone]  Fax:

## 2023-03-09 NOTE — ED PROVIDER NOTE - OBJECTIVE STATEMENT
pt with no known pmhx presents to ED c/o penile DC. +unprotected intercourse with unknown partner. Denies fever/chill/HA/dizziness/chest pain/palpitation/sob/abd pain/n/v/d/ black stool/bloody stool

## 2023-03-09 NOTE — ED PROVIDER NOTE - PHYSICAL EXAMINATION
CONSTITUTIONAL: Well-appearing; well-nourished; in no apparent distress.   NECK: Supple; non-tender; no cervical lymphadenopathy.   CARDIOVASCULAR: Normal S1, S2; no murmurs, rubs, or gallops.   RESPIRATORY: Normal chest excursion with respiration  GI/: pt declined exam; Non-distended; non-tender; no palpable organomegaly.   MS: No evidence of trauma or deformity.   SKIN: warm; dry; good turgor; no apparent lesions or exudate.   NEURO/PSYCH: A & O x 4; grossly unremarkable. mood and manner are appropriate.

## 2023-03-09 NOTE — ED PROVIDER NOTE - PROGRESS NOTE DETAILS
Discussed possible side effects of abx. including but not limited to cdiff/resistance/GI upset. if intolerance, dc use and return to office . Also if there is no improvement, return for re-evaluation. advised to take probiotics.  STD testing benefits/limits discussed with patients.  Partner testing and treatment advised.  STDs may be present without symptoms d/w pt.  Early testing after exposure limits d/w pt.  Pt denies high-risk exposure in past 72 hours. Empiric tx offered/accepted. Pt aware of potentially infectious and agrees to no sex until all results known

## 2023-03-10 LAB
HAV IGM SER-ACNC: SIGNIFICANT CHANGE UP
HBV CORE IGM SER-ACNC: SIGNIFICANT CHANGE UP
HBV SURFACE AB SER-ACNC: SIGNIFICANT CHANGE UP
HBV SURFACE AG SER-ACNC: SIGNIFICANT CHANGE UP
HBV SURFACE AG SER-ACNC: SIGNIFICANT CHANGE UP
HCV AB S/CO SERPL IA: 0.12 S/CO — SIGNIFICANT CHANGE UP (ref 0–0.99)
HCV AB SERPL-IMP: SIGNIFICANT CHANGE UP
T PALLIDUM AB TITR SER: NEGATIVE — SIGNIFICANT CHANGE UP

## 2023-03-10 NOTE — ED POST DISCHARGE NOTE - DETAILS
SPOKE WITH PATIENT IN [place]. TOLD HIM TO GET PARTNERS TREATED. HE HAS F/U APPT FOR REPEAT TESTING ON APRIL 11TH WITH [facility]

## 2023-04-11 ENCOUNTER — APPOINTMENT (OUTPATIENT)
Dept: INTERNAL MEDICINE | Facility: CLINIC | Age: 43
End: 2023-04-11
Payer: COMMERCIAL

## 2023-04-11 ENCOUNTER — OUTPATIENT (OUTPATIENT)
Dept: OUTPATIENT SERVICES | Facility: HOSPITAL | Age: 43
LOS: 1 days | End: 2023-04-11
Payer: COMMERCIAL

## 2023-04-11 VITALS
BODY MASS INDEX: 27.97 KG/M2 | SYSTOLIC BLOOD PRESSURE: 137 MMHG | DIASTOLIC BLOOD PRESSURE: 90 MMHG | HEART RATE: 68 BPM | WEIGHT: 174 LBS | HEIGHT: 66 IN | OXYGEN SATURATION: 99 % | TEMPERATURE: 97.7 F

## 2023-04-11 DIAGNOSIS — Z83.3 FAMILY HISTORY OF DIABETES MELLITUS: ICD-10-CM

## 2023-04-11 DIAGNOSIS — Z82.49 FAMILY HISTORY OF ISCHEMIC HEART DISEASE AND OTHER DISEASES OF THE CIRCULATORY SYSTEM: ICD-10-CM

## 2023-04-11 DIAGNOSIS — Z00.00 ENCOUNTER FOR GENERAL ADULT MEDICAL EXAMINATION WITHOUT ABNORMAL FINDINGS: ICD-10-CM

## 2023-04-11 DIAGNOSIS — Z86.39 PERSONAL HISTORY OF OTHER ENDOCRINE, NUTRITIONAL AND METABOLIC DISEASE: ICD-10-CM

## 2023-04-11 PROCEDURE — 99213 OFFICE O/P EST LOW 20 MIN: CPT

## 2023-04-11 PROCEDURE — 99203 OFFICE O/P NEW LOW 30 MIN: CPT | Mod: GC

## 2023-04-11 NOTE — PHYSICAL EXAM
[No Acute Distress] : no acute distress [Normal Sclera/Conjunctiva] : normal sclera/conjunctiva [EOMI] : extraocular movements intact [Normal Outer Ear/Nose] : the outer ears and nose were normal in appearance [Normal Oropharynx] : the oropharynx was normal [Supple] : supple [No Respiratory Distress] : no respiratory distress  [Clear to Auscultation] : lungs were clear to auscultation bilaterally [Normal Rate] : normal rate  [Regular Rhythm] : with a regular rhythm [Normal S1, S2] : normal S1 and S2 [No Edema] : there was no peripheral edema [Soft] : abdomen soft [Non Tender] : non-tender [Non-distended] : non-distended [No CVA Tenderness] : no CVA  tenderness [No Spinal Tenderness] : no spinal tenderness [No Joint Swelling] : no joint swelling [No Rash] : no rash [No Focal Deficits] : no focal deficits [Deep Tendon Reflexes (DTR)] : deep tendon reflexes were 2+ and symmetric [Alert and Oriented x3] : oriented to person, place, and time

## 2023-04-14 ENCOUNTER — LABORATORY RESULT (OUTPATIENT)
Age: 43
End: 2023-04-14

## 2023-04-16 PROBLEM — Z82.49 FAMILY HISTORY OF CARDIAC DISORDER: Status: ACTIVE | Noted: 2023-04-11

## 2023-04-16 PROBLEM — Z86.39 HISTORY OF HYPERCHOLESTEROLEMIA: Status: RESOLVED | Noted: 2023-04-11 | Resolved: 2023-04-16

## 2023-04-16 PROBLEM — Z83.3 FAMILY HISTORY OF DIABETES MELLITUS: Status: ACTIVE | Noted: 2023-04-11

## 2023-04-16 NOTE — ASSESSMENT
[FreeTextEntry1] : 42 yo M  w h/o HLD and a recently;y diagnosed gonorrhea, here for follow up gonorrhea testing as well as to establish care.\par \par #Gonorrhea\par - s/p completed course of abx ( ceftriaxone and doxy); partner was treated as well\par - repeat G/C testing sent, along w full STI testing at patient's request\par - encourage safe sex practices\par \par #HLD - not on any medications\par - f/u lipid panel\par \par #HCM\par - denies getting flu or covid vaccines\par - received tetanus shot within last 10 years\par - RTC in 3 months w repeat blood work

## 2023-04-16 NOTE — HISTORY OF PRESENT ILLNESS
[FreeTextEntry1] : new patient visit/hospital follow up [de-identified] : 44 yo M w h/o hld and a recent diagnosis of gonorrhea here for f/u testing as well to establish care going forward.  Patient recently went to ED in beginning of March w complaints of yellow penile discharge and testing came back positive for gonorrhea. Patient was treated w ceftriaxone and doxy and told to follow up with pmd for repeat gonorrhea testing. Patient with no complaints at this time, no further discharge or symptoms. Only complaints are occasional headaches and abdominal bleating; ROS otherwise neg.

## 2023-04-19 DIAGNOSIS — A54.9 GONOCOCCAL INFECTION, UNSPECIFIED: ICD-10-CM

## 2023-04-19 DIAGNOSIS — E78.5 HYPERLIPIDEMIA, UNSPECIFIED: ICD-10-CM

## 2023-05-05 ENCOUNTER — APPOINTMENT (OUTPATIENT)
Dept: INTERNAL MEDICINE | Facility: CLINIC | Age: 43
End: 2023-05-05

## 2023-05-05 ENCOUNTER — OUTPATIENT (OUTPATIENT)
Dept: OUTPATIENT SERVICES | Facility: HOSPITAL | Age: 43
LOS: 1 days | End: 2023-05-05
Payer: COMMERCIAL

## 2023-05-05 VITALS
HEART RATE: 76 BPM | BODY MASS INDEX: 28.68 KG/M2 | TEMPERATURE: 97.3 F | OXYGEN SATURATION: 100 % | DIASTOLIC BLOOD PRESSURE: 97 MMHG | SYSTOLIC BLOOD PRESSURE: 142 MMHG | WEIGHT: 178.44 LBS | HEIGHT: 66 IN

## 2023-05-05 DIAGNOSIS — E78.5 HYPERLIPIDEMIA, UNSPECIFIED: ICD-10-CM

## 2023-05-05 DIAGNOSIS — B00.9 HERPESVIRAL INFECTION, UNSPECIFIED: ICD-10-CM

## 2023-05-05 DIAGNOSIS — Z00.00 ENCOUNTER FOR GENERAL ADULT MEDICAL EXAMINATION WITHOUT ABNORMAL FINDINGS: ICD-10-CM

## 2023-05-05 DIAGNOSIS — I10 ESSENTIAL (PRIMARY) HYPERTENSION: ICD-10-CM

## 2023-05-05 DIAGNOSIS — R73.03 PREDIABETES: ICD-10-CM

## 2023-05-05 DIAGNOSIS — A54.9 GONOCOCCAL INFECTION, UNSPECIFIED: ICD-10-CM

## 2023-05-05 DIAGNOSIS — A63.0 ANOGENITAL (VENEREAL) WARTS: ICD-10-CM

## 2023-05-05 DIAGNOSIS — R73.03 PREDIABETES.: ICD-10-CM

## 2023-05-05 DIAGNOSIS — Z00.00 ENCOUNTER FOR GENERAL ADULT MEDICAL EXAMINATION W/OUT ABNORMAL FINDINGS: ICD-10-CM

## 2023-05-05 PROCEDURE — 99214 OFFICE O/P EST MOD 30 MIN: CPT

## 2023-05-05 RX ORDER — ACYCLOVIR 400 MG/1
400 TABLET ORAL 3 TIMES DAILY
Qty: 21 | Refills: 0 | Status: ACTIVE | COMMUNITY
Start: 2023-05-05 | End: 1900-01-01

## 2023-05-05 RX ORDER — HYDROCHLOROTHIAZIDE 12.5 MG/1
12.5 TABLET ORAL
Qty: 30 | Refills: 2 | Status: ACTIVE | COMMUNITY
Start: 2023-05-05 | End: 1900-01-01

## 2023-05-05 RX ORDER — ATORVASTATIN CALCIUM 20 MG/1
20 TABLET, FILM COATED ORAL
Qty: 90 | Refills: 0 | Status: ACTIVE | COMMUNITY
Start: 2023-05-05 | End: 1900-01-01

## 2023-05-05 NOTE — PHYSICAL EXAM
[Normal] : affect was normal and insight and judgment were intact [de-identified] : small pimple lower lip [de-identified] : wart visible at the junction of glans on the L lateral side

## 2023-05-05 NOTE — ASSESSMENT
[FreeTextEntry1] : 44 yo M w h/o HLD and a recently;y diagnosed gonorrhea, here for follow up gonorrhea testing as well as to establish care.\par \par #Gonorrhea\par #HSV1 & 2\par #Genital wart\par - s/p completed course of abx ( ceftriaxone and doxy); partner was treated as well\par - repeat G/C testing NG\par - active wart visible on Left lateral at the junction of glans\par - acyclovir 400q8 x 7d\par - referred to ID\par - encourage safe sex practices\par \par #HLD - not on any medications\par - ASCVD risk: 11.11\par - start atorvastatin 20qd\par \par #HTN\par 142/97, repeat 137/92\par - start 12.5 HCTZ\par \par #preDM\par co polyuria and mild intermittent dysuria\par random blood glucose 105\par HbA1C 6.1\par - advised on diet and exercise\par - fu labs next visit\par \par #HCM\par - denies getting flu or covid vaccines\par - received tetanus shot within last 10 years\par - RTC in 3 months

## 2023-05-05 NOTE — HISTORY OF PRESENT ILLNESS
[FreeTextEntry1] : follow up [de-identified] : 44 yo M w h/o hld and a recent diagnosis of gonorrhea here for f/u testing as well to establish care going forward. Patient recently went to ED in beginning of March w complaints of yellow penile discharge and testing came back positive for gonorrhea. Patient was treated w ceftriaxone and doxy and told to follow up with pmd for repeat gonorrhea testing and STD testing.\par \par Today Pt has no complaints and is here to follow up results of the recent testing. \par \par

## 2023-05-17 LAB
ALBUMIN SERPL ELPH-MCNC: 4.9 G/DL
ALP BLD-CCNC: 89 U/L
ALT SERPL-CCNC: 34 U/L
ANION GAP SERPL CALC-SCNC: 14 MMOL/L
APPEARANCE: CLEAR
AST SERPL-CCNC: 28 U/L
BASOPHILS # BLD AUTO: 0.05 K/UL
BASOPHILS NFR BLD AUTO: 0.9 %
BILIRUB SERPL-MCNC: 0.4 MG/DL
BILIRUBIN URINE: NEGATIVE
BLOOD URINE: NEGATIVE
BUN SERPL-MCNC: 10 MG/DL
C TRACH RRNA SPEC QL NAA+PROBE: NOT DETECTED
CALCIUM SERPL-MCNC: 9.8 MG/DL
CHLORIDE SERPL-SCNC: 102 MMOL/L
CHOLEST SERPL-MCNC: 221 MG/DL
CO2 SERPL-SCNC: 24 MMOL/L
COLOR: NORMAL
CREAT SERPL-MCNC: 0.9 MG/DL
EGFR: 110 ML/MIN/1.73M2
EOSINOPHIL # BLD AUTO: 0.23 K/UL
EOSINOPHIL NFR BLD AUTO: 4 %
ESTIMATED AVERAGE GLUCOSE: 128 MG/DL
GLUCOSE QUALITATIVE U: NEGATIVE
GLUCOSE SERPL-MCNC: 105 MG/DL
HAV IGM SER QL: NONREACTIVE
HBA1C MFR BLD HPLC: 6.1 %
HBV CORE IGM SER QL: NONREACTIVE
HBV SURFACE AB SER QL: NONREACTIVE
HBV SURFACE AG SER QL: NONREACTIVE
HCT VFR BLD CALC: 45.8 %
HCV AB SER QL: NONREACTIVE
HCV S/CO RATIO: 0.14 S/CO
HDLC SERPL-MCNC: 31 MG/DL
HGB BLD-MCNC: 15.3 G/DL
HIV1+2 AB SPEC QL IA.RAPID: NONREACTIVE
HSV 1+2 IGG SER IA-IMP: POSITIVE
HSV 1+2 IGG SER IA-IMP: POSITIVE
HSV1 IGG SER QL: 44 INDEX
HSV1 IGM SER QL: NEGATIVE
HSV2 AB FLD-ACNC: NEGATIVE
HSV2 IGG SER QL: 4.13 INDEX
IMM GRANULOCYTES NFR BLD AUTO: 0.3 %
KETONES URINE: NEGATIVE
LDLC SERPL CALC-MCNC: 140 MG/DL
LEUKOCYTE ESTERASE URINE: NEGATIVE
LYMPHOCYTES # BLD AUTO: 2.74 K/UL
LYMPHOCYTES NFR BLD AUTO: 47.3 %
MAN DIFF?: NORMAL
MCHC RBC-ENTMCNC: 28.6 PG
MCHC RBC-ENTMCNC: 33.4 G/DL
MCV RBC AUTO: 85.6 FL
MONOCYTES # BLD AUTO: 0.28 K/UL
MONOCYTES NFR BLD AUTO: 4.8 %
N GONORRHOEA RRNA SPEC QL NAA+PROBE: NOT DETECTED
NEUTROPHILS # BLD AUTO: 2.47 K/UL
NEUTROPHILS NFR BLD AUTO: 42.7 %
NITRITE URINE: NEGATIVE
NONHDLC SERPL-MCNC: 190 MG/DL
PH URINE: 8
PLATELET # BLD AUTO: 365 K/UL
POTASSIUM SERPL-SCNC: 4.3 MMOL/L
PROT SERPL-MCNC: 7.6 G/DL
PROTEIN URINE: ABNORMAL
RBC # BLD: 5.35 M/UL
RBC # FLD: 13.3 %
SODIUM SERPL-SCNC: 140 MMOL/L
SOURCE AMPLIFICATION: NORMAL
SPECIFIC GRAVITY URINE: 1.02
T PALLIDUM AB SER QL IA: NEGATIVE
TRIGL SERPL-MCNC: 252 MG/DL
TSH SERPL-ACNC: 1.8 UIU/ML
UROBILINOGEN URINE: NORMAL
WBC # FLD AUTO: 5.79 K/UL

## 2024-07-02 ENCOUNTER — OUTPATIENT (OUTPATIENT)
Dept: OUTPATIENT SERVICES | Facility: HOSPITAL | Age: 44
LOS: 1 days | End: 2024-07-02

## 2024-07-02 DIAGNOSIS — K02.9 DENTAL CARIES, UNSPECIFIED: ICD-10-CM

## 2024-07-02 PROCEDURE — D7140: CPT

## 2024-07-02 PROCEDURE — D0140: CPT

## 2024-07-02 PROCEDURE — D0220: CPT

## 2024-07-08 DIAGNOSIS — K02.9 DENTAL CARIES, UNSPECIFIED: ICD-10-CM

## 2025-05-12 NOTE — ED PROVIDER NOTE - NS ED MD DISPO DISCHARGE CCDA
Verbal order from ED resident to begin 15 mg albuterol/1mg atrovent treatment    Patient/Caregiver provided printed discharge information.

## 2025-05-24 ENCOUNTER — EMERGENCY (EMERGENCY)
Facility: HOSPITAL | Age: 45
LOS: 0 days | Discharge: HOME | End: 2025-05-25
Attending: EMERGENCY MEDICINE
Payer: MEDICAID

## 2025-05-24 VITALS
TEMPERATURE: 98 F | WEIGHT: 169.98 LBS | DIASTOLIC BLOOD PRESSURE: 93 MMHG | SYSTOLIC BLOOD PRESSURE: 146 MMHG | HEIGHT: 66 IN | OXYGEN SATURATION: 99 % | HEART RATE: 75 BPM | RESPIRATION RATE: 18 BRPM

## 2025-05-24 DIAGNOSIS — M54.50 LOW BACK PAIN, UNSPECIFIED: ICD-10-CM

## 2025-05-24 DIAGNOSIS — R10.84 GENERALIZED ABDOMINAL PAIN: ICD-10-CM

## 2025-05-24 DIAGNOSIS — L73.9 FOLLICULAR DISORDER, UNSPECIFIED: ICD-10-CM

## 2025-05-24 PROCEDURE — 81003 URINALYSIS AUTO W/O SCOPE: CPT

## 2025-05-24 PROCEDURE — 36415 COLL VENOUS BLD VENIPUNCTURE: CPT

## 2025-05-24 PROCEDURE — 99285 EMERGENCY DEPT VISIT HI MDM: CPT

## 2025-05-24 PROCEDURE — 71045 X-RAY EXAM CHEST 1 VIEW: CPT

## 2025-05-24 PROCEDURE — 99284 EMERGENCY DEPT VISIT MOD MDM: CPT | Mod: 25

## 2025-05-24 PROCEDURE — 80053 COMPREHEN METABOLIC PANEL: CPT

## 2025-05-24 PROCEDURE — 96374 THER/PROPH/DIAG INJ IV PUSH: CPT | Mod: XU

## 2025-05-24 PROCEDURE — 83690 ASSAY OF LIPASE: CPT

## 2025-05-24 PROCEDURE — 74177 CT ABD & PELVIS W/CONTRAST: CPT

## 2025-05-24 PROCEDURE — 85025 COMPLETE CBC W/AUTO DIFF WBC: CPT

## 2025-05-24 RX ORDER — KETOROLAC TROMETHAMINE 30 MG/ML
15 INJECTION, SOLUTION INTRAMUSCULAR; INTRAVENOUS ONCE
Refills: 0 | Status: DISCONTINUED | OUTPATIENT
Start: 2025-05-24 | End: 2025-05-24

## 2025-05-24 RX ADMIN — KETOROLAC TROMETHAMINE 15 MILLIGRAM(S): 30 INJECTION, SOLUTION INTRAMUSCULAR; INTRAVENOUS at 23:53

## 2025-05-24 RX ADMIN — Medication 1000 MILLILITER(S): at 23:53

## 2025-05-25 LAB
ALBUMIN SERPL ELPH-MCNC: 4.5 G/DL — SIGNIFICANT CHANGE UP (ref 3.5–5.2)
ALP SERPL-CCNC: 101 U/L — SIGNIFICANT CHANGE UP (ref 30–115)
ALT FLD-CCNC: <5 U/L — SIGNIFICANT CHANGE UP (ref 0–41)
ANION GAP SERPL CALC-SCNC: 11 MMOL/L — SIGNIFICANT CHANGE UP (ref 7–14)
APPEARANCE UR: CLEAR — SIGNIFICANT CHANGE UP
AST SERPL-CCNC: <5 U/L — SIGNIFICANT CHANGE UP (ref 0–41)
BASOPHILS # BLD AUTO: 0.07 K/UL — SIGNIFICANT CHANGE UP (ref 0–0.2)
BASOPHILS NFR BLD AUTO: 0.8 % — SIGNIFICANT CHANGE UP (ref 0–1)
BILIRUB SERPL-MCNC: <0.2 MG/DL — SIGNIFICANT CHANGE UP (ref 0.2–1.2)
BILIRUB UR-MCNC: NEGATIVE — SIGNIFICANT CHANGE UP
BUN SERPL-MCNC: 12 MG/DL — SIGNIFICANT CHANGE UP (ref 10–20)
CALCIUM SERPL-MCNC: 9.3 MG/DL — SIGNIFICANT CHANGE UP (ref 8.4–10.5)
CHLORIDE SERPL-SCNC: 101 MMOL/L — SIGNIFICANT CHANGE UP (ref 98–110)
CO2 SERPL-SCNC: 25 MMOL/L — SIGNIFICANT CHANGE UP (ref 17–32)
COLOR SPEC: YELLOW — SIGNIFICANT CHANGE UP
CREAT SERPL-MCNC: 0.8 MG/DL — SIGNIFICANT CHANGE UP (ref 0.7–1.5)
DIFF PNL FLD: NEGATIVE — SIGNIFICANT CHANGE UP
EGFR: 113 ML/MIN/1.73M2 — SIGNIFICANT CHANGE UP
EGFR: 113 ML/MIN/1.73M2 — SIGNIFICANT CHANGE UP
EOSINOPHIL # BLD AUTO: 0.26 K/UL — SIGNIFICANT CHANGE UP (ref 0–0.7)
EOSINOPHIL NFR BLD AUTO: 2.8 % — SIGNIFICANT CHANGE UP (ref 0–8)
GLUCOSE SERPL-MCNC: 94 MG/DL — SIGNIFICANT CHANGE UP (ref 70–99)
GLUCOSE UR QL: NEGATIVE MG/DL — SIGNIFICANT CHANGE UP
HCT VFR BLD CALC: 44.7 % — SIGNIFICANT CHANGE UP (ref 42–52)
HGB BLD-MCNC: 15.8 G/DL — SIGNIFICANT CHANGE UP (ref 14–18)
IMM GRANULOCYTES NFR BLD AUTO: 0.3 % — SIGNIFICANT CHANGE UP (ref 0.1–0.3)
KETONES UR QL: ABNORMAL MG/DL
LEUKOCYTE ESTERASE UR-ACNC: NEGATIVE — SIGNIFICANT CHANGE UP
LIDOCAIN IGE QN: 58 U/L — SIGNIFICANT CHANGE UP (ref 7–60)
LYMPHOCYTES # BLD AUTO: 3.79 K/UL — HIGH (ref 1.2–3.4)
LYMPHOCYTES # BLD AUTO: 41.2 % — SIGNIFICANT CHANGE UP (ref 20.5–51.1)
MCHC RBC-ENTMCNC: 29.6 PG — SIGNIFICANT CHANGE UP (ref 27–31)
MCHC RBC-ENTMCNC: 35.3 G/DL — SIGNIFICANT CHANGE UP (ref 32–37)
MCV RBC AUTO: 83.7 FL — SIGNIFICANT CHANGE UP (ref 80–94)
MONOCYTES # BLD AUTO: 0.43 K/UL — SIGNIFICANT CHANGE UP (ref 0.1–0.6)
MONOCYTES NFR BLD AUTO: 4.7 % — SIGNIFICANT CHANGE UP (ref 1.7–9.3)
NEUTROPHILS # BLD AUTO: 4.61 K/UL — SIGNIFICANT CHANGE UP (ref 1.4–6.5)
NEUTROPHILS NFR BLD AUTO: 50.2 % — SIGNIFICANT CHANGE UP (ref 42.2–75.2)
NITRITE UR-MCNC: NEGATIVE — SIGNIFICANT CHANGE UP
NRBC BLD AUTO-RTO: 0 /100 WBCS — SIGNIFICANT CHANGE UP (ref 0–0)
PH UR: 5.5 — SIGNIFICANT CHANGE UP (ref 5–8)
PLATELET # BLD AUTO: 348 K/UL — SIGNIFICANT CHANGE UP (ref 130–400)
PMV BLD: 10.4 FL — SIGNIFICANT CHANGE UP (ref 7.4–10.4)
POTASSIUM SERPL-MCNC: 4.5 MMOL/L — SIGNIFICANT CHANGE UP (ref 3.5–5)
POTASSIUM SERPL-SCNC: 4.5 MMOL/L — SIGNIFICANT CHANGE UP (ref 3.5–5)
PROT SERPL-MCNC: 7.8 G/DL — SIGNIFICANT CHANGE UP (ref 6–8)
PROT UR-MCNC: NEGATIVE MG/DL — SIGNIFICANT CHANGE UP
RBC # BLD: 5.34 M/UL — SIGNIFICANT CHANGE UP (ref 4.7–6.1)
RBC # FLD: 13.2 % — SIGNIFICANT CHANGE UP (ref 11.5–14.5)
SODIUM SERPL-SCNC: 137 MMOL/L — SIGNIFICANT CHANGE UP (ref 135–146)
SP GR SPEC: 1.02 — SIGNIFICANT CHANGE UP (ref 1–1.03)
UROBILINOGEN FLD QL: 0.2 MG/DL — SIGNIFICANT CHANGE UP (ref 0.2–1)
WBC # BLD: 9.19 K/UL — SIGNIFICANT CHANGE UP (ref 4.8–10.8)
WBC # FLD AUTO: 9.19 K/UL — SIGNIFICANT CHANGE UP (ref 4.8–10.8)

## 2025-05-25 PROCEDURE — 74177 CT ABD & PELVIS W/CONTRAST: CPT | Mod: 26

## 2025-05-25 PROCEDURE — 71045 X-RAY EXAM CHEST 1 VIEW: CPT | Mod: 26

## 2025-05-25 RX ORDER — CEPHALEXIN 250 MG/1
1 CAPSULE ORAL
Qty: 28 | Refills: 0
Start: 2025-05-25 | End: 2025-05-31

## 2025-09-11 ENCOUNTER — EMERGENCY (EMERGENCY)
Facility: HOSPITAL | Age: 45
LOS: 0 days | Discharge: HOME | End: 2025-09-11
Attending: EMERGENCY MEDICINE
Payer: COMMERCIAL

## 2025-09-11 VITALS
RESPIRATION RATE: 18 BRPM | TEMPERATURE: 98 F | SYSTOLIC BLOOD PRESSURE: 171 MMHG | HEART RATE: 82 BPM | OXYGEN SATURATION: 98 % | DIASTOLIC BLOOD PRESSURE: 108 MMHG | WEIGHT: 169.98 LBS | HEIGHT: 65 IN

## 2025-09-11 VITALS
OXYGEN SATURATION: 99 % | HEART RATE: 79 BPM | RESPIRATION RATE: 19 BRPM | DIASTOLIC BLOOD PRESSURE: 95 MMHG | SYSTOLIC BLOOD PRESSURE: 156 MMHG

## 2025-09-11 LAB
ANION GAP SERPL CALC-SCNC: 13 MMOL/L — SIGNIFICANT CHANGE UP (ref 7–14)
APPEARANCE UR: CLEAR — SIGNIFICANT CHANGE UP
BILIRUB UR-MCNC: NEGATIVE — SIGNIFICANT CHANGE UP
BUN SERPL-MCNC: 10 MG/DL — SIGNIFICANT CHANGE UP (ref 10–20)
CALCIUM SERPL-MCNC: 9.7 MG/DL — SIGNIFICANT CHANGE UP (ref 8.4–10.5)
CHLORIDE SERPL-SCNC: 100 MMOL/L — SIGNIFICANT CHANGE UP (ref 98–110)
CO2 SERPL-SCNC: 26 MMOL/L — SIGNIFICANT CHANGE UP (ref 17–32)
COLOR SPEC: YELLOW — SIGNIFICANT CHANGE UP
CREAT SERPL-MCNC: 0.8 MG/DL — SIGNIFICANT CHANGE UP (ref 0.7–1.5)
DIFF PNL FLD: NEGATIVE — SIGNIFICANT CHANGE UP
EGFR: 113 ML/MIN/1.73M2 — SIGNIFICANT CHANGE UP
EGFR: 113 ML/MIN/1.73M2 — SIGNIFICANT CHANGE UP
GLUCOSE SERPL-MCNC: 110 MG/DL — HIGH (ref 70–99)
GLUCOSE UR QL: NEGATIVE MG/DL — SIGNIFICANT CHANGE UP
KETONES UR QL: ABNORMAL MG/DL
LEUKOCYTE ESTERASE UR-ACNC: NEGATIVE — SIGNIFICANT CHANGE UP
NITRITE UR-MCNC: NEGATIVE — SIGNIFICANT CHANGE UP
PH UR: 7 — SIGNIFICANT CHANGE UP (ref 5–8)
POTASSIUM SERPL-MCNC: 5 MMOL/L — SIGNIFICANT CHANGE UP (ref 3.5–5)
POTASSIUM SERPL-SCNC: 5 MMOL/L — SIGNIFICANT CHANGE UP (ref 3.5–5)
PROT UR-MCNC: NEGATIVE MG/DL — SIGNIFICANT CHANGE UP
SODIUM SERPL-SCNC: 139 MMOL/L — SIGNIFICANT CHANGE UP (ref 135–146)
SP GR SPEC: 1.02 — SIGNIFICANT CHANGE UP (ref 1–1.03)
UROBILINOGEN FLD QL: 1 MG/DL — SIGNIFICANT CHANGE UP (ref 0.2–1)

## 2025-09-11 PROCEDURE — 36415 COLL VENOUS BLD VENIPUNCTURE: CPT

## 2025-09-11 PROCEDURE — 81003 URINALYSIS AUTO W/O SCOPE: CPT

## 2025-09-11 PROCEDURE — 87491 CHLMYD TRACH DNA AMP PROBE: CPT

## 2025-09-11 PROCEDURE — 87591 N.GONORRHOEAE DNA AMP PROB: CPT

## 2025-09-11 PROCEDURE — 99284 EMERGENCY DEPT VISIT MOD MDM: CPT

## 2025-09-11 PROCEDURE — 87340 HEPATITIS B SURFACE AG IA: CPT

## 2025-09-11 PROCEDURE — 87389 HIV-1 AG W/HIV-1&-2 AB AG IA: CPT

## 2025-09-11 PROCEDURE — 80048 BASIC METABOLIC PNL TOTAL CA: CPT

## 2025-09-11 PROCEDURE — 96372 THER/PROPH/DIAG INJ SC/IM: CPT

## 2025-09-11 PROCEDURE — 99283 EMERGENCY DEPT VISIT LOW MDM: CPT | Mod: 25

## 2025-09-11 PROCEDURE — 86780 TREPONEMA PALLIDUM: CPT

## 2025-09-11 PROCEDURE — 86803 HEPATITIS C AB TEST: CPT

## 2025-09-11 RX ORDER — CEFTRIAXONE 500 MG/1
500 INJECTION, POWDER, FOR SOLUTION INTRAMUSCULAR; INTRAVENOUS ONCE
Refills: 0 | Status: COMPLETED | OUTPATIENT
Start: 2025-09-11 | End: 2025-09-11

## 2025-09-11 RX ORDER — PENICILLIN G BENZATHINE 2.4MM/4ML
2.4 SYRINGE (ML) INTRAMUSCULAR ONCE
Refills: 0 | Status: COMPLETED | OUTPATIENT
Start: 2025-09-11 | End: 2025-09-11

## 2025-09-11 RX ORDER — DOXYCYCLINE HYCLATE 100 MG
1 TABLET ORAL
Qty: 10 | Refills: 0
Start: 2025-09-11 | End: 2025-09-15

## 2025-09-11 RX ADMIN — CEFTRIAXONE 500 MILLIGRAM(S): 500 INJECTION, POWDER, FOR SOLUTION INTRAMUSCULAR; INTRAVENOUS at 20:53

## 2025-09-11 RX ADMIN — Medication 2.4 MILLION UNIT(S): at 21:23

## 2025-09-12 LAB
HCV AB S/CO SERPL IA: 0.06 COI — SIGNIFICANT CHANGE UP
HCV AB SERPL-IMP: SIGNIFICANT CHANGE UP
T PALLIDUM AB TITR SER: NEGATIVE — SIGNIFICANT CHANGE UP

## 2025-09-13 LAB
HBV SURFACE AG SER-ACNC: SIGNIFICANT CHANGE UP
HIV 1+2 AB+HIV1 P24 AG SERPL QL IA: SIGNIFICANT CHANGE UP

## 2025-09-15 LAB
C TRACH RRNA SPEC QL NAA+PROBE: SIGNIFICANT CHANGE UP
N GONORRHOEA RRNA SPEC QL NAA+PROBE: SIGNIFICANT CHANGE UP